# Patient Record
Sex: FEMALE | Race: WHITE
[De-identification: names, ages, dates, MRNs, and addresses within clinical notes are randomized per-mention and may not be internally consistent; named-entity substitution may affect disease eponyms.]

---

## 2017-12-07 ENCOUNTER — HOSPITAL ENCOUNTER (EMERGENCY)
Dept: HOSPITAL 62 - ER | Age: 47
Discharge: HOME | End: 2017-12-07
Payer: OTHER GOVERNMENT

## 2017-12-07 VITALS — DIASTOLIC BLOOD PRESSURE: 73 MMHG | SYSTOLIC BLOOD PRESSURE: 137 MMHG

## 2017-12-07 DIAGNOSIS — K59.00: ICD-10-CM

## 2017-12-07 DIAGNOSIS — Z90.49: ICD-10-CM

## 2017-12-07 DIAGNOSIS — Z88.6: ICD-10-CM

## 2017-12-07 DIAGNOSIS — R11.0: ICD-10-CM

## 2017-12-07 DIAGNOSIS — R10.9: Primary | ICD-10-CM

## 2017-12-07 DIAGNOSIS — E66.9: ICD-10-CM

## 2017-12-07 LAB
ALBUMIN SERPL-MCNC: 3.9 G/DL (ref 3.5–5)
ALP SERPL-CCNC: 62 U/L (ref 38–126)
ALT SERPL-CCNC: 32 U/L (ref 9–52)
ANION GAP SERPL CALC-SCNC: 12 MMOL/L (ref 5–19)
APPEARANCE UR: (no result)
AST SERPL-CCNC: 19 U/L (ref 14–36)
BASOPHILS # BLD AUTO: 0 10^3/UL (ref 0–0.2)
BASOPHILS NFR BLD AUTO: 0.2 % (ref 0–2)
BILIRUB DIRECT SERPL-MCNC: 0.4 MG/DL (ref 0–0.4)
BILIRUB SERPL-MCNC: 0.9 MG/DL (ref 0.2–1.3)
BILIRUB UR QL STRIP: NEGATIVE
BUN SERPL-MCNC: 12 MG/DL (ref 7–20)
CALCIUM: 9.7 MG/DL (ref 8.4–10.2)
CHLORIDE SERPL-SCNC: 102 MMOL/L (ref 98–107)
CO2 SERPL-SCNC: 24 MMOL/L (ref 22–30)
CREAT SERPL-MCNC: 0.74 MG/DL (ref 0.52–1.25)
EOSINOPHIL # BLD AUTO: 0.1 10^3/UL (ref 0–0.6)
EOSINOPHIL NFR BLD AUTO: 0.6 % (ref 0–6)
ERYTHROCYTE [DISTWIDTH] IN BLOOD BY AUTOMATED COUNT: 13.3 % (ref 11.5–14)
GLUCOSE SERPL-MCNC: 95 MG/DL (ref 75–110)
GLUCOSE UR STRIP-MCNC: NEGATIVE MG/DL
HCT VFR BLD CALC: 37.5 % (ref 36–47)
HGB BLD-MCNC: 12.8 G/DL (ref 12–15.5)
HGB HCT DIFFERENCE: 0.9
KETONES UR STRIP-MCNC: NEGATIVE MG/DL
LIPASE SERPL-CCNC: 39.5 U/L (ref 23–300)
LYMPHOCYTES # BLD AUTO: 1.2 10^3/UL (ref 0.5–4.7)
LYMPHOCYTES NFR BLD AUTO: 11.5 % (ref 13–45)
MCH RBC QN AUTO: 28.6 PG (ref 27–33.4)
MCHC RBC AUTO-ENTMCNC: 34 G/DL (ref 32–36)
MCV RBC AUTO: 84 FL (ref 80–97)
MONOCYTES # BLD AUTO: 0.6 10^3/UL (ref 0.1–1.4)
MONOCYTES NFR BLD AUTO: 6.1 % (ref 3–13)
NEUTROPHILS # BLD AUTO: 8.3 10^3/UL (ref 1.7–8.2)
NEUTS SEG NFR BLD AUTO: 81.6 % (ref 42–78)
NITRITE UR QL STRIP: NEGATIVE
PH UR STRIP: 5 [PH] (ref 5–9)
POTASSIUM SERPL-SCNC: 4.1 MMOL/L (ref 3.6–5)
PROT SERPL-MCNC: 7.3 G/DL (ref 6.3–8.2)
PROT UR STRIP-MCNC: NEGATIVE MG/DL
RBC # BLD AUTO: 4.46 10^6/UL (ref 3.72–5.28)
SODIUM SERPL-SCNC: 138.3 MMOL/L (ref 137–145)
SP GR UR STRIP: 1.02
UROBILINOGEN UR-MCNC: NEGATIVE MG/DL (ref ?–2)
WBC # BLD AUTO: 10.2 10^3/UL (ref 4–10.5)

## 2017-12-07 PROCEDURE — 36415 COLL VENOUS BLD VENIPUNCTURE: CPT

## 2017-12-07 PROCEDURE — 74177 CT ABD & PELVIS W/CONTRAST: CPT

## 2017-12-07 PROCEDURE — 81025 URINE PREGNANCY TEST: CPT

## 2017-12-07 PROCEDURE — 80048 BASIC METABOLIC PNL TOTAL CA: CPT

## 2017-12-07 PROCEDURE — 83690 ASSAY OF LIPASE: CPT

## 2017-12-07 PROCEDURE — 85025 COMPLETE CBC W/AUTO DIFF WBC: CPT

## 2017-12-07 PROCEDURE — 80076 HEPATIC FUNCTION PANEL: CPT

## 2017-12-07 PROCEDURE — 99284 EMERGENCY DEPT VISIT MOD MDM: CPT

## 2017-12-07 PROCEDURE — 81001 URINALYSIS AUTO W/SCOPE: CPT

## 2017-12-07 PROCEDURE — 76856 US EXAM PELVIC COMPLETE: CPT

## 2017-12-07 PROCEDURE — 96374 THER/PROPH/DIAG INJ IV PUSH: CPT

## 2017-12-07 NOTE — RADIOLOGY REPORT (SQ)
EXAM DESCRIPTION:  U/S NON-OB PELVIS W/O DOP



COMPLETED DATE/TIME:  12/7/2017 9:41 am



REASON FOR STUDY:  Ovarian cyst



COMPARISON:  None.



TECHNIQUE:  Dynamic and static grayscale images acquired of the pelvis via transabdominal approach an
d recorded on PACS. Additional selected color Doppler and spectral images recorded.



LIMITATIONS:  Study limited due to the patient's body habitus.



FINDINGS:  UTERUS: Contour normal.  No mass.

ENDOMETRIAL STRIPE: No focal or generalized thickening. No masses.

CERVIX: No nabothian cysts.

RIGHT OVARY: No abnormal masses.

RIGHT OVARY DOPPLER: Normal arterial vascular flow without evidence for torsion.

LEFT OVARY: Ovary not visualized.

FREE FLUID: None noted.

OTHER: No other significant finding.

MEASUREMENTS:

UTERUS: 4.1 x 5.0 x 10.3 cm.

ENDOMETRIAL STRIPE: 7 mm.

RIGHT OVARY: 2.4 x 2.4 x 3.4 cm.

LEFT OVARY: Not visualized.



IMPRESSION:  LIMITED STUDY.  LEFT OVARY NOT VISUALIZED.  OTHERWISE NO SIGNIFICANT FINDINGS.



TECHNICAL DOCUMENTATION:  JOB ID:  [id number]

 2011 [employer]- All Rights Reserved

## 2017-12-07 NOTE — RADIOLOGY REPORT (SQ)
EXAM DESCRIPTION:  CT ABD/PELVIS WITH IV   ORAL



COMPLETED DATE/TIME:  12/7/2017 11:01 am



REASON FOR STUDY:  Acute appendicitis



COMPARISON:  None.



TECHNIQUE:  CT scan of the abdomen and pelvis performed with intravenous and oral contrast using [employer]sales
migue scanning technique with dynamic intravenous contrast injection. Images reviewed with lung, soft t
issue, and bone windows. Reconstructed coronal and sagittal MPR images reviewed. Delayed images for e
valuation of the urinary system also acquired. All images stored on PACS.

All CT scanners at this facility use dose modulation, iterative reconstruction, and/or weight based d
osing when appropriate to reduce radiation dose to as low as reasonably achievable (ALARA).

[facility]: Dose Right  [facility]: CareDose    [facility]: Dose Right    [facility]: Teradose 4D    [facility]: [employer]



CONTRAST TYPE AND DOSE:  contrast/concentration: Isovue 370.00 mg/ml; Total Contrast Delivered: 100.0
 ml; Total Saline Delivered: 72.0 ml



RENAL FUNCTION:  BUN 12 creatinine 0.74.



RADIATION DOSE:  .



LIMITATIONS:  None.



FINDINGS:  LOWER CHEST: No significant findings. No nodules or infiltrates.

LIVER: Normal size. No masses.  No dilated ducts.

SPLEEN: Normal size. No focal lesions.

PANCREAS: No masses. No significant calcifications. No adjacent inflammation or peripancreatic fluid 
collections. Pancreatic duct not dilated.

GALLBLADDER: Surgically absent.

ADRENAL GLANDS: No significant masses or asymmetry.

RIGHT KIDNEY AND URETER: No solid masses. No significant calcification. No hydronephrosis or hydroure
ter.

LEFT KIDNEY AND URETER: No solid masses. No significant calcification. No hydronephrosis or hydrouret
er.

AORTA AND VESSELS: No aneurysm. No dissection. Renal arteries, SMA, celiac without stenosis.

RETROPERITONEUM: No retroperitoneal adenopathy, hemorrhage or masses.

BOWEL AND PERITONEAL CAVITY: No obstruction. No visualized masses. No free fluid.  No inflammatory ch
anges or thickening of bowel wall.

APPENDIX: Normal.

PELVIS: No significant masses.  Normal bladder. No free fluid.

ABDOMINAL WALL: No masses.  Umbilical hernia containing fat only.

BONES: No significant or acute findings.

OTHER: No other significant finding.



IMPRESSION:  UMBILICAL HERNIA CONTAINING FAT WITH NO INVOLVEMENT OF BOWEL.  NO OTHER SIGNIFICANT OR A
CUTE FINDINGS IN THE ABDOMEN OR PELVIS.



TECHNICAL DOCUMENTATION:  JOB ID:  [id number]

Quality ID # [id number]: Final reports with documentation of one or more dose reduction techniques (e.g., Au
tomated exposure control, adjustment of the mA and/or kV according to patient size, use of iterative 
reconstruction technique)

 2011 [employer]- All Rights Reserved

## 2017-12-07 NOTE — ER DOCUMENT REPORT
ED GI/





- General


Chief Complaint: Abdominal Pain


Stated Complaint: FLANK PLAIN


Time Seen by Provider: 17 07:05


TRAVEL OUTSIDE OF THE [place] IN LAST 30 DAYS: No





- HPI


Notes: 





17 07:50


47 years old female with a history of ovarian cyst as well as inguinal hernia, 

obesity presents today with right sided abdominal pain since last night.  

Associated with nausea but no vomiting.  She denies any fever chills but had 

some loose stools.  Denies any dysuria frequency or urgency.


The pain is centered around the right side sometimes radiated to the flank.  

The scale of pain described as moderate.  Exacerbated by change of position.  

Relieved by none





- Related Data


Allergies/Adverse Reactions: 


 





codeine Allergy (Intermediate, Verified 16 16:02)


 Hallucinations








Home Medications: 


 Current Home Medications





Cholecalciferol (Vitamin D3) [Vitamin D3 5000 unit Capsule] 5,000 unit PO DAILY 

17 [History]


Metformin HCl [Metformin HCl ER] 2,000 mg PO DAILY 17 [History]


Norethindrone-E.estradiol-Iron [Loestrin Fe 1.5-30 Tablet] 1 tab PO DAILY  [History]











Past Medical History





- Social History


Smoking Status: Never Smoker


Chew tobacco use (# tins/day): No


Frequency of alcohol use: None


Drug Abuse: None


Family History: Reviewed & Not Pertinent


Patient has suicidal ideation: No


Patient has homicidal ideation: No


Renal/ Medical History: Denies: Hx Peritoneal Dialysis


Past Surgical History: Reports: Hx  Section, Hx Cholecystectomy





- Immunizations


Immunizations up to date: Yes





Review of Systems





- Review of Systems


Constitutional: No symptoms reported.  denies: See HPI, Chills, Diaphoresis, 

Fever, Malaise, Weakness, Other, Weight gain, Weight loss, Recent illness


EENT: No symptoms reported.  denies: See HPI, Eye pain, Eye discharge, Blurred 

vision, Tearing, Double vision, Ear pain, Ear discharge, Nose pain, Nose 

congestion, Nose discharge, Sinus pressure, Sinus discharge, Throat pain, 

Difficulty swallowing, Throat swelling, Mouth pain, Mouth swelling, Dental 

problem, Vertigo, Other


Cardiovascular: No symptoms reported.  denies: See HPI, Chest pain, Palpitations

, Heart racing, Orthopnea, Dyspnea, Syncope, Dizziness, Lightheaded, Edema, 

Other, Paroxysmal Nocturnal Dysp


Respiratory: No symptoms reported.  denies: See HPI, Cough, Hurts to breathe, 

Hemoptysis, Short of breath, Sputum, Stridor, Wheezing, Other


Gastrointestinal: See HPI, Abdominal pain


Genitourinary: No symptoms reported, See HPI


Female Genitourinary: No symptoms reported.  denies: See HPI, Last menstrual 

period, Pregnant, Post menopausal, Heavy/abnormal periods, Irregular period, 

Vaginal bleeding, Vaginal discharge, Vaginal odor, Painful intercourse, Other


Skin: No symptoms reported.  denies: See HPI, Change in color, Change in hair/

nails, Dryness, Lesions, Lumps, Rash, Other


Neurological/Psychological: No symptoms reported.  denies: See HPI, Confusion, 

Dementia, Depression, Hallucinations, Anxiety, Homicidal ideation, Sensory 

change, Weakness, Gait changes, Loss of power, Paralysis, Seizure, Lost 

consciousness, Headaches, Speech impairment, Numbness, Suicidal ideation, 

Tingling, Tremor, Other





Physical Exam





- Vital signs


Vitals: 


 











Temp Pulse Resp BP Pulse Ox


 


 99.3 F   100   17   150/84 H  100 


 


 17 06:40  17 06:40  17 06:40  17 06:40  17 06:40














- Notes


Notes: 





General exam: Alert oriented 3, appears well, not in any acute distress, body 

habitus--morbidly obese, seems to be in mild to moderate discomfort----.





HEENT: Normocephalic atraumatic pupils were equal reactive to light extraocular 

muscles were within normal range. 


Neck is supple no JVD no lymphadenopathy. 


Oral mucosa-not erythematous, no lesions noted no tonsillar enlargement.





Chest no lesions, nontraumatic, nontender.  No deformity





Lungs: Bilaterally clear breath sounds no rales or wheezing, no adventitial 

sounds,              no dullness on percussion.


Cardiovascular system: Normal S1-S2 no murmurs, no gallop.  Regular rhythm.  No

                                              peripheral edema over the lower 

extremities.





Gastrointestinal: Normal appearance, positive bowel sounds in all 4 quadrants, 

no


                                Hepatosplenomegaly, no obvious masses, no 

obvious abdominal bruit.  No horseshoe dullness.


                        Mild right lower quadrant tenderness noted.  No rebound 

tenderness or guarding.  No flank tenderness noted.


Inguinal region:   No masses or obvious inguinal hernia noted





Genitourinary: 





Rectal exam:





Nervous system: Alert oriented 3, no cranial nerve weakness, no focal 

neurological                                  deficit noted.  Sensation is 

intact over the lower extremities for                                   pain 

and touch.  Reflexes are 2+ over both patella.





Upper extremities: No trauma as noted, normal range of motion for both shoulders

,                                     elbows and wrist.


Lower extremity:    No traumas or deformities noted, normal range of motion for

                                           flexion extension abduction 

abduction of both hip joints,


                                   Normal flexion and extension of knee joint.


                                    Normal range of motion for plantarflexion 

dorsiflexion and                                                 eversion 

inversion for both ankles.





Skin:                   No erythema, no edema, no obvious lesions noted











Course





- Re-evaluation


Re-evalutation: 





17 11:26


The findings were discussed with patient, informed the results, was presumed 

that it could be a retained stool that giving her this pain and gassy feeling.  

Therefore prescribed stool softeners.





- Vital Signs


Vital signs: 


 











Temp Pulse Resp BP Pulse Ox


 


 99.3 F   100   17   150/84 H  100 


 


 17 06:40  17 06:40  17 06:40  17 06:40  17 06:40














- Laboratory


Result Diagrams: 


 17 08:15





 17 08:15


Laboratory results interpreted by me: 


 











  17





  08:15


 


Seg Neutrophils %  81.6 H


 


Lymphocytes %  11.5 L


 


Absolute Neutrophils  8.3 H














- Diagnostic Test


Radiology results interpreted by me: 





17 11:24


Diagnostic report text 


EXAM DESCRIPTION: CT ABD/PELVIS WITH IV ORAL 





COMPLETED DATE/TIME: 2017 11:01 am 





REASON FOR STUDY: Acute appendicitis 





COMPARISON: None. 





TECHNIQUE: CT scan of the abdomen and pelvis performed with intravenous and 

oral contrast using 


helical scanning technique with dynamic intravenous contrast injection. Images 

reviewed with lung, 


soft tissue, and bone windows. Reconstructed coronal and sagittal MPR images 

reviewed. Delayed 


images for evaluation of the urinary system also acquired. All images stored on 

PACS. 


All CT scanners at this facility use dose modulation, iterative reconstruction, 

and/or weight 


based dosing when appropriate to reduce radiation dose to as low as reasonably 

achievable (ALARA). 


[facility]: Dose Right [facility]: CareDose [facility]: Dose Right [facility]: Teradose 4D [facility]: [employer] 





CONTRAST TYPE AND DOSE: contrast/concentration: Isovue 370.00 mg/ml; Total 

Contrast Delivered: 


100.0 ml; Total Saline Delivered: 72.0 ml 





RENAL FUNCTION: BUN 12 creatinine 0.74. 





RADIATION DOSE: . 





LIMITATIONS: None. 





FINDINGS: LOWER CHEST: No significant findings. No nodules or infiltrates. 


LIVER: Normal size. No masses. No dilated ducts. 


SPLEEN: Normal size. No focal lesions. 


PANCREAS: No masses. No significant calcifications. No adjacent inflammation or 

peripancreatic 


fluid collections. Pancreatic duct not dilated. 


GALLBLADDER: Surgically absent. 


ADRENAL GLANDS: No significant masses or asymmetry. 


RIGHT KIDNEY AND URETER: No solid masses. No significant calcification. No 

hydronephrosis or 


hydroureter. 


LEFT KIDNEY AND URETER: No solid masses. No significant calcification. No 

hydronephrosis or 


hydroureter. 


AORTA AND VESSELS: No aneurysm. No dissection. Renal arteries, SMA, celiac 

without stenosis. 


RETROPERITONEUM: No retroperitoneal adenopathy, hemorrhage or masses. 


BOWEL AND PERITONEAL CAVITY: No obstruction. No visualized masses. No free 

fluid. No inflammatory 


changes or thickening of bowel wall. 


APPENDIX: Normal. 


PELVIS: No significant masses. Normal bladder. No free fluid. 


ABDOMINAL WALL: No masses. Umbilical hernia containing fat only. 


BONES: No significant or acute findings. 


OTHER: No other significant finding. 





IMPRESSION: UMBILICAL HERNIA CONTAINING FAT WITH NO INVOLVEMENT OF BOWEL. NO 

OTHER SIGNIFICANT 


OR ACUTE FINDINGS IN THE ABDOMEN OR PELVIS. 





TECHNICAL DOCUMENTATION: JOB ID: [id number] 


Quality ID # 436: Final reports with documentation of one or more dose 

reduction techniques (e.g., 


Automated exposure control, adjustment of the mA and/or kV according to patient 

size, use of 


iterative reconstruction technique) 


  [employer]- All Rights Reserved 








Dictated by: DOTTY MARTINEZ MD 


DD: 17 1112 


 





17 11:26


iagnostic report text 


EXAM DESCRIPTION: U/S NON-OB PELVIS W/O DOP 





COMPLETED DATE/TIME: 2017 9:41 am 





REASON FOR STUDY: Ovarian cyst 





COMPARISON: None. 





TECHNIQUE: Dynamic and static grayscale images acquired of the pelvis via 

transabdominal approach 


and recorded on PACS. Additional selected color Doppler and spectral images 

recorded. 





LIMITATIONS: Study limited due to the patient's body habitus. 





FINDINGS: UTERUS: Contour normal. No mass. 


ENDOMETRIAL STRIPE: No focal or generalized thickening. No masses. 


CERVIX: No nabothian cysts. 


RIGHT OVARY: No abnormal masses. 


RIGHT OVARY DOPPLER: Normal arterial vascular flow without evidence for 

torsion. 


LEFT OVARY: Ovary not visualized. 


FREE FLUID: None noted. 


OTHER: No other significant finding. 


MEASUREMENTS: 


UTERUS: 4.1 x 5.0 x 10.3 cm. 


ENDOMETRIAL STRIPE: 7 mm. 


RIGHT OVARY: 2.4 x 2.4 x 3.4 cm. 


LEFT OVARY: Not visualized. 





IMPRESSION: LIMITED STUDY. LEFT OVARY NOT VISUALIZED. OTHERWISE NO SIGNIFICANT 

FINDINGS. 





TECHNICAL DOCUMENTATION: JOB ID: [id number] 


  [employer]- All Rights Reserved 








Dictated by: DOTTY MARTINEZ MD 


DD: 17 0944 


 








Discharge





- Discharge


Clinical Impression: 


 Acute abdominal pain, Retained feces





Condition: Fair


Disposition: HOME, SELF-CARE


Instructions:  Abdominal Pain ([facility])


Prescriptions: 


Ketorolac Tromethamine [Toradol 10 mg Tablet] 10 mg PO Q6HP PRN #10 tablet


 PRN Reason: 


Dicyclomine HCl [Bentyl 10 mg Capsule] 1 cap PO TID #30 cap


Lactulose 20 gm PO BID #120 ml

## 2017-12-23 ENCOUNTER — HOSPITAL ENCOUNTER (EMERGENCY)
Dept: HOSPITAL 62 - ER | Age: 47
Discharge: HOME | End: 2017-12-23
Payer: OTHER GOVERNMENT

## 2017-12-23 VITALS — SYSTOLIC BLOOD PRESSURE: 141 MMHG | DIASTOLIC BLOOD PRESSURE: 66 MMHG

## 2017-12-23 DIAGNOSIS — M79.604: ICD-10-CM

## 2017-12-23 DIAGNOSIS — E28.2: ICD-10-CM

## 2017-12-23 DIAGNOSIS — Z79.3: ICD-10-CM

## 2017-12-23 DIAGNOSIS — E66.3: ICD-10-CM

## 2017-12-23 DIAGNOSIS — Z88.5: ICD-10-CM

## 2017-12-23 DIAGNOSIS — I80.01: Primary | ICD-10-CM

## 2017-12-23 PROCEDURE — 99284 EMERGENCY DEPT VISIT MOD MDM: CPT

## 2017-12-23 PROCEDURE — 93971 EXTREMITY STUDY: CPT

## 2017-12-23 NOTE — XCELERA REPORT
[address] [address]

                             Tel: [phone]

                             Fax: [phone]



                     Lower Extremity Venous Evaluation

____________________________________________________________________________



Name: AROLDO MAYFIELD

MRN: [id number]                Age: 47 yrs

Gender: Female                 : 1970

Patient Status: Emergency      Patient Location: ER

Account #: [id number]

Study Date: 2017 09:11 AM

Accession #: [id number]

____________________________________________________________________________



Procedure: Color flow and duplex imaging of the veins of the right lower

extremity as well as the left Common Femoral vein.

Reason For Study: right leg





Ordering Physician: NENA PEARSON

Performed By: Ana Cuevas

____________________________________________________________________________



____________________________________________________________________________





Right Sided Venous Evaluation

Enlarged veins with no compressibility or flow in the below knee Greater

Saphenous and adjacent varicosities.

Otherwise normal vessel filling wall to wall, compression and augmentation

as well as Colour flow down to the infrageniculate veins.



Left Sided Venous Evaluation

The left common femoral vein is fully compressible. Spontaneous and phasic

flow is present in the left common femoral vein.

____________________________________________________________________________





Interpretation Summary

No duplex evidence of DVT or obstruction in the right lower extremity nor

in the left Common Femoral vein. Superficial phlebitis, on the right, as

noted.

____________________________________________________________________________



____________________________________________________________________________



Electronically signed by:      Lennox Williams      on 2017 01:13 PM



CC: NENA PEARSON

>

Williams, Lennox

## 2017-12-23 NOTE — ER DOCUMENT REPORT
ED General





- General


Chief Complaint: Leg Pain


Stated Complaint: RIGHT LEG PAIN


Time Seen by Provider: 17 01:07


Notes: 


Patient is a 47-year-old female presents with complaint of a blood clot in the 

right leg.  She was seen in urgent care a few days ago.  She was diagnosed with 

a superficial thrombophlebitis.  She was told to take ibuprofen.  She denies 

taking aspirin or doing warm compresses.  She is concerned because it has 

gotten bigger and more tender therefore she is come to the ER.  She is on birth 

control.  She is overweight.  She does not smoke.  She takes birth control for 

PCOS.  She has no other complaints at this time.





TRAVEL OUTSIDE OF THE [place] IN LAST 30 DAYS: No





- Related Data


Allergies/Adverse Reactions: 


 





codeine Allergy (Intermediate, Verified 16 16:02)


 Hallucinations











Past Medical History





- Social History


Smoking Status: Unknown if Ever Smoked


Frequency of alcohol use: None


Drug Abuse: None


Family History: Reviewed & Not Pertinent


Patient has suicidal ideation: No


Patient has homicidal ideation: No


Renal/ Medical History: Denies: Hx Peritoneal Dialysis


Past Surgical History: Reports: Hx  Section, Hx Cholecystectomy





- Immunizations


Immunizations up to date: Yes





Review of Systems





- Review of Systems


Notes: 





My Normal Review Basic





REVIEW OF SYSTEMS:


CONSTITUTIONAL :  Denies fever,  chills, or sweats.  Denies recent illness.


EENT:   Denies eye, ear, throat, or mouth pain or symptoms.  Denies nasal or 

sinus congestion.


CARDIOVASCULAR:  Denies chest pain.


RESPIRATORY:  Denies cough, cold, or chest congestion.  Denies shortness of 

breath, difficulty breathing, or wheezing.


FEMALE  GENITOURINARY:  Denies vaginal bleeding, abnormal or irregular periods. 


MUSCULOSKELETAL: Right leg pain


SKIN:   Denies rash or skin lesions.


NEUROLOGICAL:  Denies altered mental status or loss of consciousness.  Denies 

headache.  Denies weakness or paralysis or loss of use of either side.  Denies 

problems with gait or speech.  Denies sensory or motor loss.


ALL OTHER SYSTEMS REVIEWED AND NEGATIVE.





Physical Exam





- Vital signs


Vitals: 


 











Temp Pulse Resp BP Pulse Ox


 


 98.0 F   92   16   153/71 H  97 


 


 17 00:42  17 00:42  17 00:42  17 00:42  17 00:42














- Notes


Notes: 





General Appearance: Well nourished, alert, cooperative, no acute distress, no 

obvious discomfort.


Vitals: reviewed, See vital signs table.


Head: no swelling or tenderness to the head


Eyes: PERRL, EOMI, Conjuctiva clear


Mouth: No decreasd moisture


Lungs: No wheezing, No rales, No rhonci, No accessory muscle use, good air 

exchange bilaterally.


Heart: Normal rate, Regular rythm, No murmur, no rub


Extremities: strength 5/5 in all extremities, good pulses in all extremities, 

patient has a small area of redness over the medial aspect of the right lower 

leg.  There is a firm area just underneath the skin consistent with what 

appears to be superficial thrombophlebitis.  Distal pulses are intact.  Left 

lower extremities normal.


Skin: warm, dry, appropriate color, no rash


Neuro: speech clear, oriented x 3, normal affect, responds appropriately to 

questions.





Course





- Re-evaluation


Re-evalutation: 





17 01:24


    Patient is requesting a DVT study.  I think this is appropriate being that 

she has multiple risk factors.  I do suspect that what were seen on exam is a 

superficial venous thrombosis.  I did talk to her about the need for aspirin as 

well as warm compresses.  I did offer her to do a DVT study out patiently 

versus staying overnight to have it done in the morning.  She wants to stay to 

morning time and have a performed.  I think this is appropriate.  study is 

ordered for the morning time.  Patient has no chest pain or shortness of breath 

or any symptoms of PE.





- Vital Signs


Vital signs: 


 











Temp Pulse Resp BP Pulse Ox


 


 98.0 F   92   16   153/71 H  97 


 


 17 00:42  17 00:42  17 00:42  17 00:42  17 00:42














Discharge





- Discharge


Clinical Impression: 


Superficial thrombophlebitis


Qualifiers:


 Superficial thrombophlebitis-Involved body area: lower extremity Laterality: 

right Qualified Code(s): I80.01 - Phlebitis and thrombophlebitis of superficial 

vessels of right lower extremity





Condition: Good


Disposition: HOME, SELF-CARE


Additional Instructions: 


Superficial Phlebitis





     You have superficial phlebitis.  This is an inflammation of the small 

veins just under the skin.  The phlebitis causes redness, warmth, and 

tenderness of the involved area.  There is no risk of pulmonary embolism (blood 

clots travelling to the lung) from this type of phlebitis.  The more serious 

type of phlebitis occurs when the large deep veins become clotted.


     Treatment usually is elevation of the involved extremity, local moist heat 

several times daily, and aspirin.  It will take several days -- sometimes even 

a couple of weeks -- for the condition to get better.


     If you develop wide-spread swelling or deep pain in the extremity, chest 

pain or shortness of breath, you should call the doctor or go to the hospital 

emergency room immediately.





Please follow up with your doctor in 5 days for reevaluation. Return to the ER 

if you have spreading redness, fevers, increased swelling, or have further 

concerns.


Forms:  Return to Work

## 2018-03-19 ENCOUNTER — HOSPITAL ENCOUNTER (OUTPATIENT)
Dept: HOSPITAL 62 - SP | Age: 48
End: 2018-03-19
Attending: NURSE PRACTITIONER
Payer: COMMERCIAL

## 2018-03-19 DIAGNOSIS — I83.893: Primary | ICD-10-CM

## 2018-03-19 PROCEDURE — 93970 EXTREMITY STUDY: CPT

## 2018-03-19 NOTE — XCELERA REPORT
[address] [address]

                             Tel: [phone]

                             Fax: [phone]



                     Lower Extremity Venous Evaluation

____________________________________________________________________________



Name: AROLDO MAYFIELD

MRN: [id number]                Age: 47 yrs

Gender: Female                 : 1970

Patient Status: Outpatient     Patient Location: 

Account #: [id number]

Study Date: 2018 11:06 AM

Accession #: [id number]

____________________________________________________________________________



Procedure: A bilateral duplex scan of the lower extremity veins was

performed. The evaluation included responses to compression and other

maneuvers with patient in the supine and standing positions to assess

venous insufficiency.

Reason For Study: VARICOSE VEINS





Ordering Physician: PREETI SAINI

Performed By: Corrina Henley

____________________________________________________________________________



____________________________________________________________________________





Right Sided Venous Evaluation

Deep venous system evaluation shows patent veins with no obstruction or

significant reflux identified.



Sapheno Femoral junction: No reflux.

Femoral vein reflux: No reflux.

Greater Saphenous vein, Proximal thigh: reflux: No reflux.

Greater Saphenous vein, Distal thigh: reflux:No reflux.

Greater Saphenous vein, Proximal below knee: reflux: 2.7 seconds, 6.9 mm

diameter.

Greater Saphenous vein, Mid below knee: reflux: 2.5 seconds, 5.6 mm

diameter.

One significant  identified. Proximal le.5 mm, 2 second

reflux. 6 cms below knee.



Left Sided Venous Evaluation

Deep venous system evaluatiion shows patent veins with no obstruction or

significant reflux identified.



Sapheno Femoral junction: No reflux.

Femoral vein reflux: No reflux.

Greater Saphenous vein, Proximal thigh: reflux: No reflux.

Greater Saphenous vein, Distal thigh: reflux:No reflux.

Greater Saphenous vein, Proximal below knee: reflux: No reflux

Greater Saphenous vein, Mid below knee: reflux: 1.7 seconds, 5.5 mm

diameter.

No significant Perforators identified.

____________________________________________________________________________





Interpretation Summary

No duplex evidence of DVT or obstruction in the bilateral lower

extremities. Superficial reflux, as noted.

____________________________________________________________________________



____________________________________________________________________________



Electronically signed by:      Lennox Williams      on 2018 06:20 PM



CC: PREETI SAINI

>

Williams, Lennox

## 2018-04-02 ENCOUNTER — HOSPITAL ENCOUNTER (OUTPATIENT)
Dept: HOSPITAL 62 - WI | Age: 48
End: 2018-04-02
Attending: OBSTETRICS & GYNECOLOGY
Payer: COMMERCIAL

## 2018-04-02 DIAGNOSIS — Z12.31: Primary | ICD-10-CM

## 2018-04-02 DIAGNOSIS — R92.2: ICD-10-CM

## 2018-04-02 PROCEDURE — 77067 SCR MAMMO BI INCL CAD: CPT

## 2018-04-02 NOTE — WOMENS IMAGING REPORT
EXAM DESCRIPTION:  BILAT SCREENING MAMMO W/CAD



COMPLETED DATE/TIME:  4/2/2018 7:59 am



REASON FOR STUDY:  SCREENING MAMMO Z12.31  ENCNTR SCREEN MAMMOGRAM FOR MALIGNANT NEOPLASM OF KUMAR



COMPARISON:  Multiple since 2010



TECHNIQUE:  Standard craniocaudal and mediolateral oblique views of each breast recorded using digita
l acquisition.



LIMITATIONS:  None.



FINDINGS:  RIGHT BREAST

MASSES: In the deep right breast along the ventral edge of the pectoralis muscle, there is a developi
ng density versus superimposed shadows for which cone compression, 90 mediolateral view and diagnost
ic ultrasound are recommended.  If possible, diagnostic tomosynthesis would be very helpful in the gr
eat toe appear

CALCIFICATIONS: No new or suspicious calcifications.

ARCHITECTURAL DISTORTION: None.

DEVELOPING DENSITY: None.

ASYMMETRY: None noted.

OTHER: No other significant findings.

LEFT BREAST

MASSES: No suspicious masses.

CALCIFICATIONS: No new or suspicious calcifications.

ARCHITECTURAL DISTORTION: None.

DEVELOPING DENSITY: None.

ASYMMETRY: None noted.

OTHER: No other significant findings.

Read with the assistance of CAD.

.[facility] - R2 Cenova Version 1.3

.[facility] - R2 Cenova Version 1.3

.[facility] - R2 Cenova Version 2.4

.[facility] - R2 Cenova Version 2.4

.[facility] - R2  Version 9.2



IMPRESSION:  Developing density versus superimposed shadows right breast deep 12 o'clock position for
 which cone compression mammograms, 90 mediolateral view and ultrasound are recommended.  If possibl
e, diagnostic tomosynthesis would be very helpful in this patient.

No mammographic evidence for malignancy left breast



BREAST DENSITY:  b. There are scattered areas of fibroglandular density.



BIRAD:  0 Incomplete:  Needs Additional Imaging Evaluation and/or prior Mammograms for Comparison.



RECOMMENDATION:  RECOMMENDED FOLLOW-UP: Additional right breast diagnostic mammograms and ultrasound.
  Tomosynthesis should also be performed if possible

The patient will be contacted for additional imaging.



COMMENT:  The patient has been notified of the results by letter per MQSA requirements. Additional no
tification policies are in place for contacting patient with suspicious or incomplete findings.

Quality ID #225: The American College of Radiology recommends an annual screening mammogram for women
 aged 40 years or over. This facility utilizes a reminder system to ensure that all patients receive 
reminder letters, and/or direct phone calls for appointments. This includes reminders for routine scr
eening mammograms, diagnostic mammograms, or other Breast Imaging Interventions when appropriate.  Th
is patient will be placed in the appropriate reminder system.

The American College of Radiology (ACR) has developed recommendations for screening MRI of the breast
s in certain patient populations, to be used in conjunction with mammography.  Breast MRI surveillanc
e may be appropriate for women with more than 20% lifetime risk of developing breast cancer  as deter
mined by genetic testing, significant family history of the disease, or history of mantle radiation f
or Hodgkins Disease.  ACR Practice Guidelines 2008.



TECHNICAL DOCUMENTATION:  FINDING NUMBER: (1)

ASSESSMENT: (1)

JOB ID:  [id number]

 2011 [employer]- All Rights Reserved



Reading location - IP/workstation name: [email or web address]

## 2020-02-03 ENCOUNTER — HOSPITAL ENCOUNTER (OUTPATIENT)
Dept: HOSPITAL 62 - RAD | Age: 50
End: 2020-02-03
Attending: NURSE PRACTITIONER
Payer: COMMERCIAL

## 2020-02-03 DIAGNOSIS — S69.92XA: Primary | ICD-10-CM
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
## 2020-02-03 NOTE — RADIOLOGY REPORT (SQ)
EXAM DESCRIPTION:  FINGERS LEFT



COMPLETED DATE/TIME:  2/3/2020 4:48 pm



REASON FOR STUDY:  INJURY OF LEFT RING FINGER S69.92XA  UNSP INJURY OF LEFT WRIST, HAND AND FINGER(S)
, INIT



COMPARISON:  None.



NUMBER OF VIEWS:  Three views.



TECHNIQUE:  AP, lateral, and oblique images acquired of the left fifth finger.



LIMITATIONS:  None.



FINDINGS:  MINERALIZATION: Normal.

BONES: No acute fracture or dislocation.  No worrisome bone lesions.

SOFT TISSUES: No soft tissue swelling.  No foreign body.

OTHER: No other significant finding.



IMPRESSION:  NO RADIOGRAPHIC EVIDENCE OF ACUTE INJURY.



TECHNICAL DOCUMENTATION:  JOB ID:  [id number]

 2011 [employer]- All Rights Reserved



Reading location - IP/workstation name: [email or web address]

## 2020-03-17 ENCOUNTER — HOSPITAL ENCOUNTER (OUTPATIENT)
Dept: HOSPITAL 62 - WI | Age: 50
End: 2020-03-17
Attending: NURSE PRACTITIONER
Payer: COMMERCIAL

## 2020-03-17 ENCOUNTER — HOSPITAL ENCOUNTER (OUTPATIENT)
Dept: HOSPITAL 62 - OD | Age: 50
End: 2020-03-17
Attending: SURGERY
Payer: COMMERCIAL

## 2020-03-17 DIAGNOSIS — Z12.31: Primary | ICD-10-CM

## 2020-03-17 DIAGNOSIS — D68.62: ICD-10-CM

## 2020-03-17 DIAGNOSIS — I83.899: Primary | ICD-10-CM

## 2020-03-17 PROCEDURE — 85306 CLOT INHIBIT PROT S FREE: CPT

## 2020-03-17 PROCEDURE — 81241 F5 GENE: CPT

## 2020-03-17 PROCEDURE — 86225 DNA ANTIBODY NATIVE: CPT

## 2020-03-17 PROCEDURE — 36415 COLL VENOUS BLD VENIPUNCTURE: CPT

## 2020-03-17 PROCEDURE — 77063 BREAST TOMOSYNTHESIS BI: CPT

## 2020-03-17 PROCEDURE — 86235 NUCLEAR ANTIGEN ANTIBODY: CPT

## 2020-03-17 PROCEDURE — 85302 CLOT INHIBIT PROT C ANTIGEN: CPT

## 2020-03-17 PROCEDURE — 83520 IMMUNOASSAY QUANT NOS NONAB: CPT

## 2020-03-17 PROCEDURE — 77067 SCR MAMMO BI INCL CAD: CPT

## 2020-03-17 NOTE — WOMENS IMAGING REPORT
EXAM DESCRIPTION:  3D SCREENING MAMMO BILAT



COMPLETED DATE/TIME:  3/17/2020 7:49 am



REASON FOR STUDY:  Z12.31 ENCOUNTER FOR SCREENING MAMMOGRAM FOR MALIGNANT NEOPLASM OF BREAST Z12.31  
ENCNTR SCREEN MAMMOGRAM FOR MALIGNANT NEOPLASM OF KUMAR



COMPARISON:  4/2/2018 and 11/10/2016.



EXAM PARAMETERS:  Standard craniocaudal and mediolateral oblique views of each breast recorded using 
digital acquisition and breast tomosynthesis.

Read with the assistance of CAD.

.[facility] -   Version 9.2



LIMITATIONS:  None.



FINDINGS:  Findings present which are benign by mammographic criteria. No suspicious masses, calcific
ations or architectural distortion.

Pertinent benign findings: Benign calcifications.

Benign mammographic findings may include one or more of the following: Smooth masses, popcorn/rim/coa
rse calcifications, asymmetries, post-procedure changes, and lesions with long-standing stability.



IMPRESSION:  BENIGN MAMMOGRAPHIC FINDINGS.  BIRADS 2



BREAST DENSITY:  b. There are scattered areas of fibroglandular density.



BIRAD:  ASSESSMENT:  2 BENIGN FINDING(S)



RECOMMENDATION:  ROUTINE SCREENING



COMMENT:  The patient has been notified of the results by letter per SA requirements. Additional no
tification policies are in place for contacting patient with suspicious or incomplete findings.

Quality ID #225: The American College of Radiology recommends an annual screening mammogram for women
 aged 40 years or over. This facility utilizes a reminder system to ensure that all patients receive 
reminder letters, and/or direct phone calls for appointments. This includes reminders for routine scr
eening mammograms, diagnostic mammograms, or other Breast Imaging Interventions when appropriate.  Th
is patient will be placed in the appropriate reminder system.



TECHNICAL DOCUMENTATION:  FINDING NUMBER: (1)

ASSESSMENT:  (1)

JOB ID:  [id number]

 2011 [employer]- All Rights Reserved



Reading location - IP/workstation name: [facility]-RR

## 2020-03-18 LAB
SCREEN APTT: 35.2 SEC (ref 0–51.9)
SCREEN DRVVT: 37.6 SEC (ref 0–47)
THROMBIN TIME: 17.9 SEC (ref 0–23)

## 2020-03-19 LAB
PROT C ACT/NOR PPP: 124 % (ref 73–180)
PROT C AG ACT/NOR PPP IA: 110 % (ref 60–150)

## 2020-09-29 ENCOUNTER — HOSPITAL ENCOUNTER (OUTPATIENT)
Dept: HOSPITAL 62 - RAD | Age: 50
End: 2020-09-29
Attending: NURSE PRACTITIONER
Payer: COMMERCIAL

## 2020-09-29 DIAGNOSIS — M25.512: Primary | ICD-10-CM

## 2020-09-29 DIAGNOSIS — M25.511: ICD-10-CM

## 2020-09-29 NOTE — RADIOLOGY REPORT (SQ)
EXAM DESCRIPTION:  SHOULDER LEFT 2 OR MORE VIEWS



IMAGES COMPLETED DATE/TIME:  9/29/2020 2:10 pm



REASON FOR STUDY:  PAIN IN LT SHOULDER M25.512  PAIN IN LEFT SHOULDER



COMPARISON:  None.



NUMBER OF VIEWS:  Three view.



TECHNIQUE:  Internal rotation, external rotation, and Y view images acquired of the left shoulder.



LIMITATIONS:  None.



FINDINGS:  MINERALIZATION: Normal.

BONES: No acute fracture. No worrisome bone lesions.

GLENOHUMERAL JOINT: No significant findings.

ACROMIOCLAVICULAR JOINT: Moderate arthropathy.

SOFT TISSUES: No calcifications.

VISUALIZED RIBS, SPINE, AND LUNG: Cardiomegaly.

OTHER: No other significant finding.



IMPRESSION:  AC joint arthropathy.



TECHNICAL DOCUMENTATION:  JOB ID:  [id number]

 2011 [employer]- All Rights Reserved



Reading location - IP/workstation name: [email or web address]

## 2020-09-29 NOTE — RADIOLOGY REPORT (SQ)
EXAM DESCRIPTION:  SHOULDER RIGHT 2 OR MORE VIEWS



IMAGES COMPLETED DATE/TIME:  9/29/2020 2:10 pm



REASON FOR STUDY:  RT SHOULDER PAIN M25.512  PAIN IN LEFT SHOULDER



COMPARISON:  None.



NUMBER OF VIEWS:  Three views.



TECHNIQUE:  Internal rotation, external rotation, and Y view images acquired of the right shoulder.



LIMITATIONS:  None.



FINDINGS:  MINERALIZATION: Normal.

BONES: No acute fracture. No worrisome bone lesions.

GLENOHUMERAL JOINT: No significant findings.

ACROMIOCLAVICULAR JOINT: Moderate arthropathy.

SOFT TISSUES: No calcifications.

VISUALIZED RIBS, SPINE, AND LUNG: No other significant finding.

OTHER: No other significant finding.



IMPRESSION:  AC joint arthropathy.



TECHNICAL DOCUMENTATION:  JOB ID:  [id number]

 2011 [email or web address]- All Rights Reserved



Reading location - IP/workstation name: [email or web address]

## 2020-10-18 ENCOUNTER — HOSPITAL ENCOUNTER (OUTPATIENT)
Dept: HOSPITAL 62 - RAD | Age: 50
End: 2020-10-18
Attending: NURSE PRACTITIONER
Payer: COMMERCIAL

## 2020-10-18 DIAGNOSIS — M25.561: Primary | ICD-10-CM

## 2020-10-18 NOTE — RADIOLOGY REPORT (SQ)
EXAM DESCRIPTION:  ELBOW RIGHT OVER 2 VIEWS



IMAGES COMPLETED DATE/TIME:  10/18/2020 1:14 pm



REASON FOR STUDY:  PAIN



COMPARISON:  Right shoulder radiograph, 9/29/2020.



NUMBER OF VIEWS:  Four views.



TECHNIQUE:  AP, lateral, and both oblique radiographic images acquired of the right elbow.



LIMITATIONS:  None.



FINDINGS:  MINERALIZATION: Normal.

BONES: There is an acute intra-articular fracture of the radial head.  The proximal ulna and distal h
umerus are intact.

JOINT: Moderate elbow joint effusion.

SOFT TISSUES: No soft tissue swelling.  No foreign body.

OTHER: No other significant finding.



IMPRESSION:  Acute intra-articular fracture right radial head.  No dislocation.  Moderate elbow joint
 effusion.



TECHNICAL DOCUMENTATION:  JOB ID:  [id number]

 2011 [employer]- All Rights Reserved



Reading location - IP/workstation name: [id number]

## 2020-10-18 NOTE — RADIOLOGY REPORT (SQ)
EXAM DESCRIPTION:  KNEE BILATERAL 1-2 VIEWS



IMAGES COMPLETED DATE/TIME:  10/18/2020 1:14 pm



REASON FOR STUDY:  PAIN



COMPARISON:  None.



NUMBER OF VIEWS:  Four views.



TECHNIQUE:  AP and lateral standing bilateral knees.



LIMITATIONS:  None.



FINDINGS:  MINERALIZATION: Normal.

RIGHT KNEE

BONES: No acute fracture. No worrisome bone lesions.

MEDIAL COMPARTMENT: Small marginal osteophytes.  Bony spurring of the tibial spines.   Mild medial sabra
int space narrowing.   No chondrocalcinosis.

LATERAL COMPARTMENT: Small marginal osteophytes.  Bony spurring of the tibial spines.   No joint spac
e narrowing.   No chondrocalcinosis.

PATELLOFEMORAL COMPARTMENT:  Small marginal osteophytes.   No joint space narrowing.   No chondrocalc
inosis.

LEFT KNEE

BONES: No acute fracture. No worrisome bone lesions.

MEDIAL COMPARTMENT: Small marginal osteophytes.  Bony spurring of the tibial spines.   Mild medial sabra
int space narrowing.   No chondrocalcinosis.

LATERAL COMPARTMENT: Small marginal osteophytes.   No joint space narrowing.   No chondrocalcinosis.

PATELLOFEMORAL COMPARTMENT:  Small marginal osteophytes.   No joint space narrowing.   No chondrocalc
inosis.



IMPRESSION:  Mild-to-moderate tricompartmental osteoarthritis.  No acute fracture or dislocation of e
ither knee.



TECHNICAL DOCUMENTATION:  JOB ID:  [id number]

 2011 [employer]- All Rights Reserved



Reading location - IP/workstation name: [email or web address]

## 2021-01-19 ENCOUNTER — HOSPITAL ENCOUNTER (OUTPATIENT)
Dept: HOSPITAL 62 - RAD | Age: 51
End: 2021-01-19
Attending: PHYSICIAN ASSISTANT
Payer: COMMERCIAL

## 2021-01-19 DIAGNOSIS — S52.124D: Primary | ICD-10-CM
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
## 2021-01-19 NOTE — RADIOLOGY REPORT (SQ)
EXAM DESCRIPTION:  MRI RT UPPER JOINT WITHOUT



IMAGES COMPLETED DATE/TIME:  1/19/2021 3:34 pm



REASON FOR STUDY:  (S52.124D)NONDISP FX OF HEAD OF R RAD, SUBS FOR CLOS FX W ROUTN HEAL S52.124D  NON
DISP FX OF HEAD OF R RAD, SUBS FOR CLOS FX W ROU

(S52.124D)NONDISP FX OF HEAD OF R RAD, SUBS FOR CLOS FX W ROUTN HEAL S52.124D  NONDISP FX OF HEAD OF 
R RAD, SUBS FOR CLOS FX W ROU.  Right elbow pain, decreased range of motion, popping, weakness, stiff
ness and pain deep in the joint after landing on elbow in October 2020.  Physical therapy.  History o
f radial fracture. Intra-articular radial head fracture on recent radiographs.



COMPARISON:  Right elbow radiograph, 10/18/2020



TECHNIQUE:  Right elbow images acquired and stored on PACS. Multiplanar images to include fat sensiti
ve sequences as T1, fluid sensitive sequences as T2/STIR, cartilage sensitive sequences as FSPD, and 
gradient echo sequences.



LIMITATIONS:  None.



FINDINGS:  BONE MARROW: Depressed fracture at the radial head with small sclerotic margin consistent 
with healing fracture.  No bone marrow edema.

JOINT EFFUSION: Small residual joint effusion.

ARTICULAR SURFACES: Normal.

MEDIAL COLLATERAL LIGAMENT COMPLEX: Intact without edema or tear.

MEDIAL EPICONDYLE AND COMMON FLEXOR TENDON: No tendinopathy.  No partial or full-thickness tear.

LATERAL COLLATERAL LIGAMENT: Intact without edema or tear.

LATERAL EPICONDYLE AND COMMON EXTENSOR TENDON: No tendinopathy.  No partial or full-thickness tear.

LATERAL ULNAR COLLATERAL LIGAMENT: Intact without evidence for tear.

BICEPS TENDON: Intact. No partial or full-thickness tendon tear. No muscle edema.

TRICEPS TENDON: Intact.

ULNAR NERVE: Well-visualized without edema or encroachment.

ADJACENT SOFT TISSUES: No masses or edema.

OTHER: No other significant finding.



IMPRESSION:  Minimally displaced impacted fracture of the radial head without significant edema.  Per
sistent small joint effusion.  No ligamentous injury or dislocation.



TECHNICAL DOCUMENTATION:  JOB ID:  [id number]

 2011 [employer]- All Rights Reserved



Reading location - IP/workstation name: [email or web address]